# Patient Record
Sex: FEMALE | Race: WHITE
[De-identification: names, ages, dates, MRNs, and addresses within clinical notes are randomized per-mention and may not be internally consistent; named-entity substitution may affect disease eponyms.]

---

## 2017-07-02 ENCOUNTER — HOSPITAL ENCOUNTER (EMERGENCY)
Dept: HOSPITAL 76 - ED | Age: 72
Discharge: HOME | End: 2017-07-02
Payer: MEDICARE

## 2017-07-02 VITALS — SYSTOLIC BLOOD PRESSURE: 160 MMHG | DIASTOLIC BLOOD PRESSURE: 69 MMHG

## 2017-07-02 DIAGNOSIS — I10: ICD-10-CM

## 2017-07-02 DIAGNOSIS — Z79.84: ICD-10-CM

## 2017-07-02 DIAGNOSIS — J45.901: Primary | ICD-10-CM

## 2017-07-02 DIAGNOSIS — R73.03: ICD-10-CM

## 2017-07-02 PROCEDURE — 99283 EMERGENCY DEPT VISIT LOW MDM: CPT

## 2017-07-02 PROCEDURE — 71020: CPT

## 2017-07-02 NOTE — XRAY PRELIMINARY REPORT
Accession: [id number]

Exam: XR Chest 2 View PA/LAT

 

IMPRESSION: Mild bilateral hyperaeration. No focal consolidation. No pleural effusion. No acute abnor
mality seen.

 

[facility]

 

SITE ID: [id number]

## 2017-07-02 NOTE — ED PHYSICIAN DOCUMENTATION
History of Present Illness





- Stated complaint


Stated Complaint: FEVER/WEAKNESS





- Chief complaint


Chief Complaint: Fever





- History obtained from


History obtained from: Patient





- History of Present Illness


Timing: Other (71-year-old woman with 1 week of worsening productive cough 

productive of green sputum with fevers and fatigue since yesterday.  She does 

have a history of asthma for which she takes an inhaled steroid and has been 

hospitalized in the past but feels like she is moving okay air right now.  No 

sick contacts.  No runny nose or sore throat.)





Review of Systems


Constitutional: reports: Fever, Fatigue.  denies: Chills


Nose: denies: Rhinorrhea / runny nose, Congestion


Cardiac: denies: Chest pain / pressure, Palpitations


Respiratory: reports: Dyspnea, Cough.  denies: Hemoptysis, Wheezing


GI: denies: Abdominal Pain





PD PAST MEDICAL HISTORY





- Past Medical History


Past Medical History: No


Cardiovascular: Hypertension


Other Past Medical History: pre-diabetes





- Past Surgical History


Past Surgical History: No


General: Cholecystectomy


HEENT: Tonsil/Adenoidectomy





- Present Medications


Home Medications: 


 Ambulatory Orders











 Medication  Instructions  Recorded  Confirmed


 


Albuterol Sulfate [Proventil Hfa 1 - 2 puffs IH Q4H PRN #1 07/02/17 





Inhaler] hfa.aer.ad  


 


Azithromycin [Zithromax] 250 mg PO DAILY #6 tablet 07/02/17 


 


Fluconazole [Diflucan] 150 mg PO ONCE PRN #1 tablet 07/02/17 


 


Inhaler  1 puffs PO DAILY 07/02/17 


 


Levothyroxine Sodium [Synthroid] 88 mcg PO DAILY 07/02/17 07/02/17


 


Lisinopril 50 mg PO DAILY 07/02/17 07/02/17


 


Metoprolol Succinate [Toprol Xl] 25 mg PO DAILY 07/02/17 07/02/17


 


metFORMIN [Glucophage] 500 mg PO BID 07/02/17 07/02/17


 


predniSONE [Deltasone] 60 mg PO DAILY 5 Days 07/02/17 














- Allergies


Allergies/Adverse Reactions: 


 Allergies











Allergy/AdvReac Type Severity Reaction Status Date / Time


 


oxytocin [From Pitocin] Allergy  Anaphylaxis Verified 07/02/17 11:34














- Social History


Does the pt smoke?: No


Smoking Status: Never smoker


Does the pt drink ETOH?: Yes


ETOH Use: Wine


Does the pt have substance abuse?: No





PD ED PE NORMAL





- Vitals


Vital signs reviewed: Yes





- General


General: Alert and oriented X 3, No acute distress





- HEENT


HEENT: PERRL, Ears normal, Moist mucous membranes, Pharynx benign





- Neck


Neck: Supple, no meningeal sign, No bony TTP





- Cardiac


Cardiac: RRR, No murmur





- Respiratory


Respiratory: No respiratory distress, Other (Wheezy throughout but with good 

air motion, rhonchi at the right base)





- Abdomen


Abdomen: Non tender





- Extremities


Extremities: No edema, No calf tenderness / cord





- Neuro


Neuro: Alert and oriented X 3, Normal speech





- Psych


Psych: Normal mood, Normal affect





Results





- Vitals


Vitals: 





 Vital Signs - 24 hr











  07/02/17





  11:31


 


Temperature 36.8 C


 


Heart Rate 90


 


Respiratory 20





Rate 


 


Blood Pressure 160/69 H


 


O2 Saturation 95








 Oxygen











O2 Source                      Room air

















- Rads (name of study)


  ** 2 view chest


Radiology: EMP read contemporaneously (Hyperinflation without clear acute 

disease)





PD MEDICAL DECISION MAKING





- ED course


ED course: 





This is a 71-year-old woman with history of asthma, and has some wheezing with 

focal breath sounds despite a relatively clear chest x-ray.  Given the ongoing 

fevers, seems prudent to treat with antibiotics.





Departure





- Departure


Disposition: 01 Home, Self Care


Clinical Impression: 


Asthmatic bronchitis


Qualifiers:


 Asthma severity: moderate persistent Asthma complication type: with acute 

exacerbation Qualified Code(s): J45.41 - Moderate persistent asthma with (acute

) exacerbation


Condition: Good


Record reviewed to determine appropriate education?: Yes


Instructions:  ED Bronchitis Asthmatic


Prescriptions: 


predniSONE [Deltasone] 60 mg PO DAILY 5 Days


Fluconazole [Diflucan] 150 mg PO ONCE PRN #1 tablet


 PRN Reason: yeast inf


Albuterol Sulfate [Proventil Hfa Inhaler] 1 - 2 puffs IH Q4H PRN #1 hfa.aer.ad


 PRN Reason: Cough


Azithromycin [Zithromax] 250 mg PO DAILY #6 tablet


Comments: 


Call your doctor to arrange a follow-up appointment, make the next available 

appointment.  In the interim, return anytime if worse or if new symptoms 

develop.





Your blood pressure was elevated today on check into the emergency department.  

This does not mean that you have hypertension, it is a common phenomenon to 

come to the emergency department and have elevated blood pressure.  I recommend 

that she see her primary care physician within the week to have it rechecked 

when you are feeling better.

## 2017-07-02 NOTE — XRAY REPORT
EXAM:

CHEST RADIOGRAPHY

 

EXAM DATE: 7/2/2017 12:16 PM.

 

CLINICAL HISTORY: Cough x one week.

 

COMPARISON: 11/05/2011..

 

TECHNIQUE: 2 views.

 

FINDINGS: 

Lungs/Pleura: Scarring at left lung base with improved aeration compared to prior study. No other foc
al opacities evident. No pleural effusion. No pneumothorax. Mild bilateral hyperaeration.

 

Mediastinum: Heart and mediastinal contours are unremarkable.

 

Other: None.

 

IMPRESSION: Mild bilateral hyperaeration. No focal consolidation. No pleural effusion. No acute abnor
mality seen.

 

[facility]

Referring Provider Line: [phone]

 

SITE ID: [id number]

## 2018-12-26 ENCOUNTER — HOSPITAL ENCOUNTER (OUTPATIENT)
Dept: HOSPITAL 76 - LAB.F | Age: 73
Discharge: HOME | End: 2018-12-26
Attending: INTERNAL MEDICINE
Payer: MEDICARE

## 2018-12-26 DIAGNOSIS — E03.9: ICD-10-CM

## 2018-12-26 DIAGNOSIS — K51.20: ICD-10-CM

## 2018-12-26 DIAGNOSIS — I10: Primary | ICD-10-CM

## 2018-12-26 LAB
ALBUMIN DIAFP-MCNC: 3.5 G/DL (ref 3.2–5.5)
ALBUMIN/GLOB SERPL: 1.1 {RATIO} (ref 1–2.2)
ALP SERPL-CCNC: 66 IU/L (ref 42–121)
ALT SERPL W P-5'-P-CCNC: 26 IU/L (ref 10–60)
ANION GAP SERPL CALCULATED.4IONS-SCNC: 7 MMOL/L (ref 6–13)
AST SERPL W P-5'-P-CCNC: 23 IU/L (ref 10–42)
BASOPHILS NFR BLD AUTO: 0.1 10^3/UL (ref 0–0.1)
BASOPHILS NFR BLD AUTO: 0.5 %
BILIRUB BLD-MCNC: 0.9 MG/DL (ref 0.2–1)
BUN SERPL-MCNC: 13 MG/DL (ref 6–20)
CALCIUM UR-MCNC: 9.1 MG/DL (ref 8.5–10.3)
CHLORIDE SERPL-SCNC: 105 MMOL/L (ref 101–111)
CO2 SERPL-SCNC: 25 MMOL/L (ref 21–32)
CREAT SERPLBLD-SCNC: 1.1 MG/DL (ref 0.4–1)
EOSINOPHIL # BLD AUTO: 0.1 10^3/UL (ref 0–0.7)
EOSINOPHIL NFR BLD AUTO: 0.7 %
ERYTHROCYTE [DISTWIDTH] IN BLOOD BY AUTOMATED COUNT: 14 % (ref 12–15)
GFRSERPLBLD MDRD-ARVRAT: 49 ML/MIN/{1.73_M2} (ref 89–?)
GLOBULIN SER-MCNC: 3.1 G/DL (ref 2.1–4.2)
GLUCOSE SERPL-MCNC: 158 MG/DL (ref 70–100)
HGB UR QL STRIP: 13.6 G/DL (ref 12–16)
LYMPHOCYTES # SPEC AUTO: 2.9 10^3/UL (ref 1.5–3.5)
LYMPHOCYTES NFR BLD AUTO: 17.7 %
MCH RBC QN AUTO: 29.5 PG (ref 27–31)
MCHC RBC AUTO-ENTMCNC: 32.1 G/DL (ref 32–36)
MCV RBC AUTO: 91.9 FL (ref 81–99)
MONOCYTES # BLD AUTO: 1 10^3/UL (ref 0–1)
MONOCYTES NFR BLD AUTO: 5.9 %
NEUTROPHILS # BLD AUTO: 12.2 10^3/UL (ref 1.5–6.6)
NEUTROPHILS # SNV AUTO: 16.3 X10^3/UL (ref 4.8–10.8)
NEUTROPHILS NFR BLD AUTO: 75.2 %
PDW BLD AUTO: 9.6 FL (ref 7.9–10.8)
PLATELET # BLD: 364 10^3/UL (ref 130–450)
PROT SPEC-MCNC: 6.6 G/DL (ref 6.7–8.2)
RBC MAR: 4.61 10^6/UL (ref 4.2–5.4)
SODIUM SERPLBLD-SCNC: 137 MMOL/L (ref 135–145)

## 2018-12-26 PROCEDURE — 85025 COMPLETE CBC W/AUTO DIFF WBC: CPT

## 2018-12-26 PROCEDURE — 80053 COMPREHEN METABOLIC PANEL: CPT

## 2018-12-26 PROCEDURE — 36415 COLL VENOUS BLD VENIPUNCTURE: CPT

## 2018-12-26 PROCEDURE — 84443 ASSAY THYROID STIM HORMONE: CPT

## 2018-12-28 ENCOUNTER — HOSPITAL ENCOUNTER (OUTPATIENT)
Dept: HOSPITAL 76 - LAB.F | Age: 73
Discharge: HOME | End: 2018-12-28
Attending: INTERNAL MEDICINE
Payer: MEDICARE

## 2018-12-28 DIAGNOSIS — K51.20: Primary | ICD-10-CM

## 2018-12-28 LAB
BASOPHILS NFR BLD AUTO: 0.1 10^3/UL (ref 0–0.1)
BASOPHILS NFR BLD AUTO: 0.7 %
EOSINOPHIL # BLD AUTO: 0.2 10^3/UL (ref 0–0.7)
EOSINOPHIL NFR BLD AUTO: 2.1 %
ERYTHROCYTE [DISTWIDTH] IN BLOOD BY AUTOMATED COUNT: 14 % (ref 12–15)
HGB UR QL STRIP: 13 G/DL (ref 12–16)
LYMPHOCYTES # SPEC AUTO: 2.1 10^3/UL (ref 1.5–3.5)
LYMPHOCYTES NFR BLD AUTO: 21.8 %
MCH RBC QN AUTO: 30 PG (ref 27–31)
MCHC RBC AUTO-ENTMCNC: 32.9 G/DL (ref 32–36)
MCV RBC AUTO: 91.4 FL (ref 81–99)
MONOCYTES # BLD AUTO: 0.6 10^3/UL (ref 0–1)
MONOCYTES NFR BLD AUTO: 6 %
NEUTROPHILS # BLD AUTO: 6.6 10^3/UL (ref 1.5–6.6)
NEUTROPHILS # SNV AUTO: 9.6 X10^3/UL (ref 4.8–10.8)
NEUTROPHILS NFR BLD AUTO: 69.4 %
PDW BLD AUTO: 10.1 FL (ref 7.9–10.8)
PLATELET # BLD: 335 10^3/UL (ref 130–450)
RBC MAR: 4.33 10^6/UL (ref 4.2–5.4)

## 2018-12-28 PROCEDURE — 85025 COMPLETE CBC W/AUTO DIFF WBC: CPT

## 2018-12-28 PROCEDURE — 36415 COLL VENOUS BLD VENIPUNCTURE: CPT

## 2019-12-16 ENCOUNTER — HOSPITAL ENCOUNTER (OUTPATIENT)
Dept: HOSPITAL 76 - LAB.S | Age: 74
Discharge: HOME | End: 2019-12-16
Attending: INTERNAL MEDICINE
Payer: MEDICARE

## 2019-12-16 DIAGNOSIS — E03.9: Primary | ICD-10-CM

## 2019-12-16 DIAGNOSIS — I10: ICD-10-CM

## 2019-12-16 DIAGNOSIS — K51.20: ICD-10-CM

## 2019-12-16 DIAGNOSIS — R73.02: ICD-10-CM

## 2019-12-16 LAB
ALBUMIN DIAFP-MCNC: 3.9 G/DL (ref 3.2–5.5)
ALBUMIN/GLOB SERPL: 1.1 {RATIO} (ref 1–2.2)
ALP SERPL-CCNC: 66 IU/L (ref 42–121)
ALT SERPL W P-5'-P-CCNC: 19 IU/L (ref 10–60)
ANION GAP SERPL CALCULATED.4IONS-SCNC: 7 MMOL/L (ref 6–13)
AST SERPL W P-5'-P-CCNC: 24 IU/L (ref 10–42)
BILIRUB BLD-MCNC: 0.9 MG/DL (ref 0.2–1)
BUN SERPL-MCNC: 9 MG/DL (ref 6–20)
CALCIUM UR-MCNC: 9.2 MG/DL (ref 8.5–10.3)
CHLORIDE SERPL-SCNC: 105 MMOL/L (ref 101–111)
CHOLEST SERPL-MCNC: 212 MG/DL
CO2 SERPL-SCNC: 28 MMOL/L (ref 21–32)
CREAT SERPLBLD-SCNC: 0.6 MG/DL (ref 0.4–1)
ERYTHROCYTE [DISTWIDTH] IN BLOOD BY AUTOMATED COUNT: 13.1 % (ref 12–15)
EST. AVERAGE GLUCOSE BLD GHB EST-MCNC: 171 MG/DL (ref 70–100)
GFRSERPLBLD MDRD-ARVRAT: 98 ML/MIN/{1.73_M2} (ref 89–?)
GLOBULIN SER-MCNC: 3.4 G/DL (ref 2.1–4.2)
GLUCOSE SERPL-MCNC: 164 MG/DL (ref 70–100)
HB2 TOTAL: 14.6 G/DL
HBA1C BLD-MCNC: 0.87 G/DL
HDLC SERPL-MCNC: 48 MG/DL
HDLC SERPL: 4.4 {RATIO} (ref ?–4.4)
HEMOGLOBIN A1C %: 7.6 % (ref 4.6–6.2)
HGB UR QL STRIP: 14.3 G/DL (ref 12–16)
LDLC SERPL CALC-MCNC: 118 MG/DL
LDLC/HDLC SERPL: 2.5 {RATIO} (ref ?–4.4)
MCH RBC QN AUTO: 29.1 PG (ref 27–31)
MCHC RBC AUTO-ENTMCNC: 31.5 G/DL (ref 32–36)
MCV RBC AUTO: 92.3 FL (ref 81–99)
NEUTROPHILS # SNV AUTO: 6.6 X10^3/UL (ref 4.8–10.8)
PDW BLD AUTO: 12.2 FL (ref 7.9–10.8)
PLATELET # BLD: 292 10^3/UL (ref 130–450)
PROT SPEC-MCNC: 7.3 G/DL (ref 6.7–8.2)
RBC MAR: 4.92 10^6/UL (ref 4.2–5.4)
SODIUM SERPLBLD-SCNC: 140 MMOL/L (ref 135–145)
T4 SERPL-MCNC: 8.89 UG/DL (ref 6.09–12.23)
VLDLC SERPL-SCNC: 46 MG/DL

## 2019-12-16 PROCEDURE — 80061 LIPID PANEL: CPT

## 2019-12-16 PROCEDURE — 80053 COMPREHEN METABOLIC PANEL: CPT

## 2019-12-16 PROCEDURE — 84481 FREE ASSAY (FT-3): CPT

## 2019-12-16 PROCEDURE — 84443 ASSAY THYROID STIM HORMONE: CPT

## 2019-12-16 PROCEDURE — 85027 COMPLETE CBC AUTOMATED: CPT

## 2019-12-16 PROCEDURE — 83721 ASSAY OF BLOOD LIPOPROTEIN: CPT

## 2019-12-16 PROCEDURE — 36415 COLL VENOUS BLD VENIPUNCTURE: CPT

## 2019-12-16 PROCEDURE — 83036 HEMOGLOBIN GLYCOSYLATED A1C: CPT

## 2019-12-16 PROCEDURE — 84436 ASSAY OF TOTAL THYROXINE: CPT

## 2020-03-10 ENCOUNTER — HOSPITAL ENCOUNTER (OUTPATIENT)
Dept: HOSPITAL 76 - LAB | Age: 75
Discharge: HOME | End: 2020-03-10
Attending: INTERNAL MEDICINE
Payer: MEDICARE

## 2020-03-10 DIAGNOSIS — E11.9: ICD-10-CM

## 2020-03-10 DIAGNOSIS — E03.8: Primary | ICD-10-CM

## 2020-03-10 DIAGNOSIS — L65.9: ICD-10-CM

## 2020-03-10 LAB
ALBUMIN DIAFP-MCNC: 4.2 G/DL (ref 3.2–5.5)
ALBUMIN/GLOB SERPL: 1.2 {RATIO} (ref 1–2.2)
ALP SERPL-CCNC: 63 IU/L (ref 42–121)
ALT SERPL W P-5'-P-CCNC: 16 IU/L (ref 10–60)
ANION GAP SERPL CALCULATED.4IONS-SCNC: 9 MMOL/L (ref 6–13)
AST SERPL W P-5'-P-CCNC: 18 IU/L (ref 10–42)
BILIRUB BLD-MCNC: 0.6 MG/DL (ref 0.2–1)
BUN SERPL-MCNC: 8 MG/DL (ref 6–20)
CALCIUM UR-MCNC: 9.5 MG/DL (ref 8.5–10.3)
CHLORIDE SERPL-SCNC: 102 MMOL/L (ref 101–111)
CHOLEST SERPL-MCNC: 201 MG/DL
CO2 SERPL-SCNC: 26 MMOL/L (ref 21–32)
CREAT SERPLBLD-SCNC: 0.7 MG/DL (ref 0.4–1)
CREAT UR-SCNC: 168 MG/DL
EST. AVERAGE GLUCOSE BLD GHB EST-MCNC: 154 MG/DL (ref 70–100)
GFRSERPLBLD MDRD-ARVRAT: 82 ML/MIN/{1.73_M2} (ref 89–?)
GLOBULIN SER-MCNC: 3.4 G/DL (ref 2.1–4.2)
GLUCOSE SERPL-MCNC: 148 MG/DL (ref 70–100)
HB2 TOTAL: 15.1 G/DL
HBA1C BLD-MCNC: 0.8 G/DL
HDLC SERPL-MCNC: 50 MG/DL
HDLC SERPL: 4 {RATIO} (ref ?–4.4)
HEMOGLOBIN A1C %: 7 % (ref 4.6–6.2)
IRON SATN MFR SERPL: 20 % (ref 20–50)
IRON SERPL-MCNC: 75 UG/DL (ref 28–170)
LDLC SERPL CALC-MCNC: 116 MG/DL
LDLC/HDLC SERPL: 2.3 {RATIO} (ref ?–4.4)
MAGNESIUM SERPL-MCNC: 1.9 MG/DL (ref 1.7–2.8)
MICROALBUMIN UR-MCNC: 3.4 MG/DL (ref 0–300)
MICROALBUMIN/CREAT RATIO PNL UR: 20.2 UG/MG (ref ?–30)
PROT SPEC-MCNC: 7.6 G/DL (ref 6.7–8.2)
SODIUM SERPLBLD-SCNC: 137 MMOL/L (ref 135–145)
T4 FREE SERPL-MCNC: 1.05 NG/DL (ref 0.58–1.64)
TIBC SERPL-MCNC: 378 UG/DL (ref 250–450)
TRANSFERRIN SERPL-MCNC: 270 MG/DL (ref 192–382)
TSH SERPL-ACNC: 1.15 UIU/ML (ref 0.34–5.6)
VLDLC SERPL-SCNC: 35 MG/DL

## 2020-03-10 PROCEDURE — 83540 ASSAY OF IRON: CPT

## 2020-03-10 PROCEDURE — 82043 UR ALBUMIN QUANTITATIVE: CPT

## 2020-03-10 PROCEDURE — 84443 ASSAY THYROID STIM HORMONE: CPT

## 2020-03-10 PROCEDURE — 84481 FREE ASSAY (FT-3): CPT

## 2020-03-10 PROCEDURE — 80061 LIPID PANEL: CPT

## 2020-03-10 PROCEDURE — 82570 ASSAY OF URINE CREATININE: CPT

## 2020-03-10 PROCEDURE — 84466 ASSAY OF TRANSFERRIN: CPT

## 2020-03-10 PROCEDURE — 36415 COLL VENOUS BLD VENIPUNCTURE: CPT

## 2020-03-10 PROCEDURE — 80053 COMPREHEN METABOLIC PANEL: CPT

## 2020-03-10 PROCEDURE — 83036 HEMOGLOBIN GLYCOSYLATED A1C: CPT

## 2020-03-10 PROCEDURE — 83721 ASSAY OF BLOOD LIPOPROTEIN: CPT

## 2020-03-10 PROCEDURE — 83735 ASSAY OF MAGNESIUM: CPT

## 2020-03-10 PROCEDURE — 84439 ASSAY OF FREE THYROXINE: CPT

## 2020-05-28 ENCOUNTER — HOSPITAL ENCOUNTER (EMERGENCY)
Dept: HOSPITAL 76 - ED | Age: 75
Discharge: HOME | End: 2020-05-28
Payer: MEDICARE

## 2020-05-28 VITALS — SYSTOLIC BLOOD PRESSURE: 137 MMHG | DIASTOLIC BLOOD PRESSURE: 63 MMHG

## 2020-05-28 DIAGNOSIS — I10: ICD-10-CM

## 2020-05-28 DIAGNOSIS — N30.90: Primary | ICD-10-CM

## 2020-05-28 LAB
CLARITY UR REFRACT.AUTO: CLEAR
GLUCOSE UR QL STRIP.AUTO: NEGATIVE MG/DL
KETONES UR QL STRIP.AUTO: NEGATIVE MG/DL
NITRITE UR QL STRIP.AUTO: NEGATIVE
PH UR STRIP.AUTO: 8 PH (ref 5–7.5)
PROT UR STRIP.AUTO-MCNC: NEGATIVE MG/DL
RBC # UR STRIP.AUTO: (no result) /UL
RBC # URNS HPF: (no result) /HPF (ref 0–5)
SP GR UR STRIP.AUTO: 1.01 (ref 1–1.03)
SQUAMOUS URNS QL MICRO: (no result)
UROBILINOGEN UR QL STRIP.AUTO: (no result) E.U./DL
UROBILINOGEN UR STRIP.AUTO-MCNC: NEGATIVE MG/DL

## 2020-05-28 PROCEDURE — 81003 URINALYSIS AUTO W/O SCOPE: CPT

## 2020-05-28 PROCEDURE — 81001 URINALYSIS AUTO W/SCOPE: CPT

## 2020-05-28 PROCEDURE — 87086 URINE CULTURE/COLONY COUNT: CPT

## 2020-05-28 PROCEDURE — 99283 EMERGENCY DEPT VISIT LOW MDM: CPT

## 2020-05-28 NOTE — ED PHYSICIAN DOCUMENTATION
PD HPI FEMALE 





- Stated complaint


Stated Complaint: FEMALE 





- Chief complaint


Chief Complaint: UTI





- History obtained from


History obtained from: Patient (74-year-old woman with sudden onset dysuria and 

frequency starting around noon today without leg pain, fevers.  She does have 

some mild low back pain.  Last UTI was approximately 6 years ago.)





Review of Systems


Constitutional: denies: Fever, Chills


Nose: reports: Reviewed and negative


Throat: reports: Reviewed and negative





PD PAST MEDICAL HISTORY





- Past Medical History


Cardiovascular: Hypertension





- Past Surgical History


Past Surgical History: No


General: Cholecystectomy


HEENT: Tonsil/Adenoidectomy





- Present Medications


Home Medications: 


                                Ambulatory Orders











 Medication  Instructions  Recorded  Confirmed


 


Albuterol Sulfate [Proventil Hfa 1 - 2 puffs IH Q4H PRN #1 07/02/17 





Inhaler] hfa.aer.ad  


 


Azithromycin [Zithromax] 250 mg PO DAILY #6 tablet 07/02/17 


 


Fluconazole [Diflucan] 150 mg PO ONCE PRN #1 tablet 07/02/17 


 


Inhaler  1 puffs PO DAILY 07/02/17 


 


Levothyroxine Sodium [Synthroid] 88 mcg PO DAILY 07/02/17 07/02/17


 


Metoprolol Succinate [Toprol Xl] 25 mg PO DAILY 07/02/17 07/02/17


 


lisinopriL [Lisinopril] 50 mg PO DAILY 07/02/17 07/02/17


 


metFORMIN [Glucophage] 500 mg PO BID 07/02/17 07/02/17


 


predniSONE [Deltasone] 60 mg PO DAILY 5 Days  tablet 07/02/17 


 


Ciprofloxacin [Cipro] 250 mg PO Q12H #10 tablet 05/28/20 


 


Phenazopyridine HCl [Pyridium] 200 mg PO TID PRN #6 tablet 05/28/20 














- Allergies


Allergies/Adverse Reactions: 


                                    Allergies











Allergy/AdvReac Type Severity Reaction Status Date / Time


 


oxytocin [From Pitocin] Allergy  Anaphylaxis Verified 05/28/20 14:30














- Social History


Does the pt smoke?: No


Smoking Status: Never smoker


Does the pt drink ETOH?: Yes


Does the pt have substance abuse?: No





PD ED PE NORMAL





- Vitals


Vital signs reviewed: Yes





- General


General: Alert and oriented X 3, No acute distress





- Abdomen


Abdomen: Soft, Non tender





- Back


Back: No CVA TTP





- Neuro


Neuro: Alert and oriented X 3, Normal speech





Results





- Vitals


Vitals: 





                               Vital Signs - 24 hr











  05/28/20 05/28/20





  14:31 16:28


 


Temperature 36.7 C 36.6 C


 


Heart Rate 73 63


 


Respiratory 15 18





Rate  


 


Blood Pressure 140/84 H 137/63 H


 


O2 Saturation 96 99








                                     Oxygen











O2 Source                      Room air

















- Labs


Labs: 





                                Laboratory Tests











  05/28/20





  14:40


 


Urine Color  LT. YELLOW


 


Urine Clarity  CLEAR


 


Urine pH  8.0 H


 


Ur Specific Gravity  1.010


 


Urine Protein  NEGATIVE


 


Urine Glucose (UA)  NEGATIVE


 


Urine Ketones  NEGATIVE


 


Urine Occult Blood  LARGE H


 


Urine Nitrite  NEGATIVE


 


Urine Bilirubin  NEGATIVE


 


Urine Urobilinogen  0.2 (NORMAL)


 


Ur Leukocyte Esterase  MODERATE H


 


Urine RBC  0-5


 


Urine WBC  11-25 H


 


Ur Squamous Epith Cells  NONE SEEN


 


Urine Bacteria  Few


 


Ur Microscopic Review  INDICATED


 


Urine Culture Comments  INDICATED














Departure





- Departure


Disposition: 01 Home, Self Care


Clinical Impression: 


 Cystitis





Condition: Good


Record reviewed to determine appropriate education?: Yes


Instructions:  ED UTI Cystitis Female


Prescriptions: 


Ciprofloxacin [Cipro] 250 mg PO Q12H #10 tablet


Phenazopyridine HCl [Pyridium] 200 mg PO TID PRN #6 tablet


 PRN Reason: dysuria


Comments: 


We will culture your urine, the results should be done in 48-72 hours.  If an 

antibiotic change is necessary we will call you.  Return if worse in the 

meantime, especially if you develop increasing flank pain, fevers, or cannot 

keep down the medication.

## 2020-09-08 ENCOUNTER — HOSPITAL ENCOUNTER (OUTPATIENT)
Dept: HOSPITAL 76 - LAB.S | Age: 75
Discharge: HOME | End: 2020-09-08
Attending: INTERNAL MEDICINE
Payer: MEDICARE

## 2020-09-08 DIAGNOSIS — L65.9: ICD-10-CM

## 2020-09-08 DIAGNOSIS — E03.8: Primary | ICD-10-CM

## 2020-09-08 DIAGNOSIS — E11.9: ICD-10-CM

## 2020-09-08 LAB
CHOLEST SERPL-MCNC: 197 MG/DL
CREAT UR-SCNC: 186.6 MG/DL
FERRITIN SERPL-MCNC: 44.5 NG/ML (ref 11–306.8)
HBA1C MFR BLD HPLC: 6.5 % (ref 4.27–6.07)
HDLC SERPL-MCNC: 47 MG/DL
HDLC SERPL: 4.2 {RATIO} (ref ?–4.4)
IRON SATN MFR SERPL: 14 % (ref 20–50)
IRON SERPL-MCNC: 57 UG/DL (ref 28–170)
LDLC SERPL CALC-MCNC: 119 MG/DL
LDLC/HDLC SERPL: 2.5 {RATIO} (ref ?–4.4)
MAGNESIUM SERPL-MCNC: 2.1 MG/DL (ref 1.7–2.8)
MICROALBUMIN UR-MCNC: 1.1 MG/DL (ref 0–300)
MICROALBUMIN/CREAT RATIO PNL UR: 5.9 UG/MG (ref ?–30)
T3FREE SERPL-MCNC: 3.73 PG/ML (ref 2.5–3.9)
T4 FREE SERPL-MCNC: 0.92 NG/DL (ref 0.58–1.64)
TIBC SERPL-MCNC: 406 UG/DL (ref 250–450)
TRANSFERRIN SERPL-MCNC: 290 MG/DL (ref 192–382)
TSH SERPL-ACNC: 1.53 UIU/ML (ref 0.34–5.6)
VLDLC SERPL-SCNC: 31 MG/DL

## 2020-09-08 PROCEDURE — 83721 ASSAY OF BLOOD LIPOPROTEIN: CPT

## 2020-09-08 PROCEDURE — 84481 FREE ASSAY (FT-3): CPT

## 2020-09-08 PROCEDURE — 82043 UR ALBUMIN QUANTITATIVE: CPT

## 2020-09-08 PROCEDURE — 84439 ASSAY OF FREE THYROXINE: CPT

## 2020-09-08 PROCEDURE — 80061 LIPID PANEL: CPT

## 2020-09-08 PROCEDURE — 36415 COLL VENOUS BLD VENIPUNCTURE: CPT

## 2020-09-08 PROCEDURE — 82570 ASSAY OF URINE CREATININE: CPT

## 2020-09-08 PROCEDURE — 84443 ASSAY THYROID STIM HORMONE: CPT

## 2020-09-08 PROCEDURE — 84466 ASSAY OF TRANSFERRIN: CPT

## 2020-09-08 PROCEDURE — 83735 ASSAY OF MAGNESIUM: CPT

## 2020-09-08 PROCEDURE — 83540 ASSAY OF IRON: CPT

## 2020-09-08 PROCEDURE — 83036 HEMOGLOBIN GLYCOSYLATED A1C: CPT

## 2020-09-08 PROCEDURE — 82728 ASSAY OF FERRITIN: CPT
